# Patient Record
Sex: FEMALE | Race: WHITE | Employment: UNEMPLOYED | URBAN - METROPOLITAN AREA
[De-identification: names, ages, dates, MRNs, and addresses within clinical notes are randomized per-mention and may not be internally consistent; named-entity substitution may affect disease eponyms.]

---

## 2024-05-04 ENCOUNTER — HOSPITAL ENCOUNTER (EMERGENCY)
Facility: HOSPITAL | Age: 24
Discharge: HOME | End: 2024-05-04
Attending: EMERGENCY MEDICINE
Payer: MEDICAID

## 2024-05-04 VITALS
OXYGEN SATURATION: 99 % | HEIGHT: 65 IN | BODY MASS INDEX: 20.94 KG/M2 | TEMPERATURE: 98 F | RESPIRATION RATE: 20 BRPM | WEIGHT: 125.69 LBS | DIASTOLIC BLOOD PRESSURE: 80 MMHG | SYSTOLIC BLOOD PRESSURE: 136 MMHG | HEART RATE: 92 BPM

## 2024-05-04 DIAGNOSIS — N30.01 ACUTE CYSTITIS WITH HEMATURIA: Primary | ICD-10-CM

## 2024-05-04 LAB
BILIRUB UR QL STRIP.AUTO: NEGATIVE
CLARITY UR REFRACT.AUTO: CLEAR
COLOR UR AUTO: COLORLESS
GLUCOSE UR STRIP.AUTO-MCNC: NORMAL MG/DL
KETONES UR STRIP.AUTO-MCNC: NEGATIVE MG/DL
LEUKOCYTE ESTERASE UR QL STRIP.AUTO: 500
NITRITE UR QL STRIP.AUTO: NEGATIVE
PH UR STRIP.AUTO: 5.5 [PH] (ref 5–8)
PROT UR STRIP.AUTO-MCNC: NEGATIVE MG/DL
SP GR UR STRIP.AUTO: 1.01 (ref 1–1.03)
UROBILINOGEN UR STRIP.AUTO-MCNC: NORMAL MG/DL

## 2024-05-04 PROCEDURE — 87086 URINE CULTURE/COLONY COUNT: CPT | Performed by: EMERGENCY MEDICINE

## 2024-05-04 PROCEDURE — 99283 EMERGENCY DEPT VISIT LOW MDM: CPT

## 2024-05-04 PROCEDURE — 99284 EMERGENCY DEPT VISIT MOD MDM: CPT

## 2024-05-04 PROCEDURE — 81001 URINALYSIS AUTO W/SCOPE: CPT | Performed by: EMERGENCY MEDICINE

## 2024-05-04 RX ORDER — CEPHALEXIN 500 MG/1
500 CAPSULE ORAL 2 TIMES DAILY
Qty: 10 CAPSULE | Refills: 0 | Status: SHIPPED | OUTPATIENT
Start: 2024-05-04 | End: 2024-05-09

## 2024-05-04 NOTE — ED PROVIDER NOTES
Patient Seen in: [facility] Emergency Department      History     Chief Complaint   Patient presents with    Urinary Symptoms     Blood in urine, 1 month post partum vaginal delivery      Stated Complaint: urinary issues    Subjective:   23-year-old female, no chronic past medical history, reports vaginal delivery about 6 weeks ago with no complications, presents with hematuria.  Noticed it yesterday and today.  She no urinary frequency urgency or dysuria.  She has no vaginal bleeding.  States it is not coming as a vaginal source is completely in her urine.  No clots.  No flank pain or acute back pain.  No fevers.  No abdominal pain whatsoever.  She has no other complaints.            Objective:   History reviewed. No pertinent past medical history.           History reviewed. No pertinent surgical history.             Social History     Socioeconomic History    Marital status:    Tobacco Use    Smoking status: Never    Smokeless tobacco: Never   Vaping Use    Vaping status: Never Used   Substance and Sexual Activity    Alcohol use: Not Currently    Drug use: Never              Review of Systems   Constitutional:  Negative for fever.   Respiratory:  Negative for shortness of breath.    Cardiovascular:  Negative for chest pain.   Gastrointestinal:  Negative for abdominal pain.   Genitourinary:  Positive for hematuria. Negative for dysuria, flank pain, pelvic pain and vaginal bleeding.   Musculoskeletal:  Negative for back pain.   Neurological:  Negative for headaches.       Positive for stated complaint: urinary issues  Other systems are as noted in HPI.  Constitutional and vital signs reviewed.      All other systems reviewed and negative except as noted above.    Physical Exam     ED Triage Vitals [05/04/24 0800]   /90   Pulse 105   Resp 20   Temp 98.5 °F (36.9 °C)   Temp src Oral   SpO2 99 %   O2 Device None (Room air)       Current:/85   Pulse 115   Temp 98.5 °F (36.9 °C) (Oral)    Resp 20   Ht 165.1 cm (5' 5\")   Wt 57 kg   LMP  (LMP Unknown)   SpO2 98%   BMI 20.91 kg/m²         Physical Exam  Vitals and nursing note reviewed.   HENT:      Head: Normocephalic.   Cardiovascular:      Rate and Rhythm: Normal rate and regular rhythm.      Pulses: Normal pulses.      Heart sounds: Normal heart sounds.   Pulmonary:      Effort: Pulmonary effort is normal. No respiratory distress.      Breath sounds: Normal breath sounds.   Abdominal:      General: There is no distension.      Palpations: Abdomen is soft.      Tenderness: There is no abdominal tenderness. There is no right CVA tenderness, left CVA tenderness, guarding or rebound.   Musculoskeletal:         General: Normal range of motion.      Cervical back: Neck supple.   Skin:     General: Skin is warm and dry.   Neurological:      General: No focal deficit present.      Mental Status: She is alert.   Psychiatric:         Mood and Affect: Mood normal.         Behavior: Behavior normal.               ED Course     Labs Reviewed   URINALYSIS, ROUTINE - Abnormal; Notable for the following components:       Result Value    Urine Color Colorless (*)     Blood Urine Trace (*)     Leukocyte Esterase Urine 500 (*)     WBC Urine 6-10 (*)     RBC Urine 3-5 (*)     Bacteria Urine Rare (*)     Squamous Epi. Cells Few (*)     All other components within normal limits   URINE CULTURE, ROUTINE                      MDM      Differential diagnosis includes, but not limited to, kidney stone, vaginal bleeding, menstrual cycle, cystitis     at bedside helpful to provide information history present illness    External chart review demonstrates her delivery at [facility] last month    22-year-old female with some hematuria today and yesterday.  No real urgency frequency or dysuria.  No flank pain.  No abdominal pain.  Not vaginal bleeding.  She is only in her urine.  No clots.  No fevers.  Does have leukocyte esterase, bacteria like blood cells and blood.   Will treat her for UTI, Keflex.  She is breast-feeding.  Twice daily x 5 days.  Discussed risk benefits alternatives with her.  Shared decision making utilized and return precaution provided    Patient was screened and evaluated during this visit.  As the treating physician attending to the patient, I determined within reasonable clinical confidence and prior to discharge, that an emergency medical condition was not or was no longer present.  There was no indication for further evaluation, treatment, or admission on an emergency basis.  Comprehensive verbal and written discharge and follow-up instructions were provided to help prevent relapse or worsening.  Patient was instructed to follow-up with their primary care provider for further evaluation and treatment, return immediately to ER for worsening, concerning, new, or changing/persisting symptoms. I discussed the case with the patient and they had no questions, complaints, or concerns.  Patient was comfortable going home.     Per the discharge paperwork, patients are encouraged to and given instructions on how to sign up for MyChart, where they have access to their records, including any/all incidental findings.     This note was prepared using Dragon Medical voice recognition dictation software. As a result errors may occur. When identified these errors have been corrected. While every attempt is made to correct errors during dictation discrepancies may still exist    Note to patient: The 21st Century Cures Act makes medical notes like these available to patients in the interest of transparency. However, this is a medical document intended as peer to peer communication. It is written in medical language and may contain abbreviations or verbiage that are unfamiliar. It may appear blunt or direct. Medical documents are intended to carry relevant information, facts as evident, and the clinical opinion of the practitioner.                                       Medical Decision Making      Disposition and Plan     Clinical Impression:  1. Acute cystitis with hematuria         Disposition:  Discharge  5/4/2024  9:07 am    Follow-up:  Mony Gill, BRITTNEY  [address] [facility] [id number] [phone]    Follow up            Medications Prescribed:  Current Discharge Medication List        START taking these medications    Details   cephalexin 500 MG Oral Cap Take 1 capsule (500 mg total) by mouth 2 (two) times daily for 5 days.  Qty: 10 capsule, Refills: 0

## 2024-05-04 NOTE — ED INITIAL ASSESSMENT (HPI)
PT states that she noticed significant bloody urine since yesterday in the morning. PT states that she is concerned as she delivered her baby on 03/27/2024. PT states that she has no other symptoms at this time

## 2024-06-19 ENCOUNTER — HOSPITAL ENCOUNTER (EMERGENCY)
Facility: HOSPITAL | Age: 24
Discharge: HOME | End: 2024-06-19
Attending: EMERGENCY MEDICINE

## 2024-06-19 VITALS
OXYGEN SATURATION: 96 % | BODY MASS INDEX: 19.96 KG/M2 | TEMPERATURE: 98 F | RESPIRATION RATE: 18 BRPM | WEIGHT: 116.88 LBS | SYSTOLIC BLOOD PRESSURE: 130 MMHG | HEART RATE: 116 BPM | HEIGHT: 64.17 IN | DIASTOLIC BLOOD PRESSURE: 85 MMHG

## 2024-06-19 DIAGNOSIS — N61.0 MASTITIS: Primary | ICD-10-CM

## 2024-06-19 PROCEDURE — 99283 EMERGENCY DEPT VISIT LOW MDM: CPT

## 2024-06-19 PROCEDURE — 99284 EMERGENCY DEPT VISIT MOD MDM: CPT

## 2024-06-19 NOTE — DISCHARGE INSTRUCTIONS
You were seen in the emergency department.  Please take the antibiotics as prescribed.  With the instructions provided.  Apply warm compresses to the painful area throughout the day.  Continue to breast-feed.  Return to the emergency department if develop fevers, vomiting, worsening pain or swelling, or any other new concerns or worsening symptoms.  Please follow-up closely with your OB/GYN or primary care physician for reevaluation.

## 2024-06-19 NOTE — ED PROVIDER NOTES
Patient Seen in: [facility] Emergency Department      History     Chief Complaint   Patient presents with    Breast Problem     Stated Complaint: breast issue    Subjective:   HPI  Patient is a 25 yo F who is approximately 3 months post partum s/p vaginal delivery who presents to ED for evaluation of L sided breast pain and redness over past 2 days. Pt notes she had tmax of 38 at home on Monday which resolved after taking ibuprofen. Pt denies any drainage. No vomiting, abd pain. Pt is currently breastfeeding.     Objective:   History reviewed. No pertinent past medical history.           History reviewed. No pertinent surgical history.             Social History     Socioeconomic History    Marital status:    Tobacco Use    Smoking status: Never    Smokeless tobacco: Never   Vaping Use    Vaping status: Never Used   Substance and Sexual Activity    Alcohol use: Not Currently    Drug use: Never              Review of Systems    Positive for stated complaint: breast issue  Other systems are as noted in HPI.  Constitutional and vital signs reviewed.      All other systems reviewed and negative except as noted above.    Physical Exam     ED Triage Vitals [06/19/24 0847]   /85   Pulse 116   Resp 18   Temp 97.8 °F (36.6 °C)   Temp src Temporal   SpO2 96 %   O2 Device None (Room air)       Current Vitals:   Vital Signs  BP: 130/85  Pulse: 116  Resp: 18  Temp: 97.8 °F (36.6 °C)  Temp src: Temporal    Oxygen Therapy  SpO2: 96 %  O2 Device: None (Room air)            Physical Exam  Vitals and nursing note reviewed.   Constitutional:       General: She is not in acute distress.     Appearance: She is not ill-appearing.   HENT:      Head: Normocephalic and atraumatic.      Mouth/Throat:      Mouth: Mucous membranes are moist.   Eyes:      Extraocular Movements: Extraocular movements intact.      Pupils: Pupils are equal, round, and reactive to light.   Cardiovascular:      Rate and Rhythm: Normal rate and  regular rhythm.   Pulmonary:      Effort: Pulmonary effort is normal.   Chest:          Comments: No nipple drainage  Abdominal:      General: There is no distension.      Palpations: Abdomen is soft.      Tenderness: There is no abdominal tenderness.   Musculoskeletal:      Cervical back: Neck supple.      Right lower leg: No edema.      Left lower leg: No edema.   Skin:     General: Skin is warm and dry.      Capillary Refill: Capillary refill takes less than 2 seconds.   Neurological:      General: No focal deficit present.      Mental Status: She is alert.   Psychiatric:         Mood and Affect: Mood normal.               ED Course   Labs Reviewed - No data to display                   MDM      Patient is a 23 yo F who is approximately 3 months post partum s/p vaginal delivery who presents to ED for evaluation of L sided breast pain and redness over past 2 days. Tmax of 38 at home. VSS. Pt is well appearing on exam. She has mild L sided breast tenderness and erythema at 12 o'clock position. No drainage. No fluctuance. Initial differential considered includes but not limited to mastitis, abscess.  Bedside US performed which shows no obvious fluid collection. I counseled patient on supportive care with warm compresses. Discussed continuing breastfeeding. Prescribed dicloxacillin for mastitis. Recommended close f/u with OB/GYN or PCP for re-evaluation. Patient is stable for discharge home. Discussed strict return precautions and supportive care. Patient verbalized understanding and agreement of plan.                                      Medical Decision Making      Disposition and Plan     Clinical Impression:  1. Mastitis         Disposition:  Discharge  6/19/2024  9:31 am    Follow-up:  No follow-up provider specified.        Medications Prescribed:  Discharge Medication List as of 6/19/2024  9:35 AM        START taking these medications    Details   dicloxacillin 500 MG Oral Cap Take 1 capsule (500 mg total) by  mouth 4 (four) times daily for 10 days., Normal, Disp-40 capsule, R-0

## 2024-06-19 NOTE — ED INITIAL ASSESSMENT (HPI)
Since Mon pt c/o pain and redness in her left breast. Pt had a fever Mon took Ibuprofen and it went away. Pt is currently breastfeeding.

## 2024-07-23 ENCOUNTER — HOSPITAL ENCOUNTER (EMERGENCY)
Facility: HOSPITAL | Age: 24
Discharge: HOME | End: 2024-07-23
Attending: EMERGENCY MEDICINE
Payer: MEDICAID

## 2024-07-23 VITALS
HEART RATE: 97 BPM | TEMPERATURE: 97 F | RESPIRATION RATE: 18 BRPM | DIASTOLIC BLOOD PRESSURE: 74 MMHG | OXYGEN SATURATION: 99 % | SYSTOLIC BLOOD PRESSURE: 112 MMHG

## 2024-07-23 DIAGNOSIS — R31.9 URINARY TRACT INFECTION WITH HEMATURIA, SITE UNSPECIFIED: Primary | ICD-10-CM

## 2024-07-23 DIAGNOSIS — N39.0 URINARY TRACT INFECTION WITH HEMATURIA, SITE UNSPECIFIED: Primary | ICD-10-CM

## 2024-07-23 LAB
B-HCG UR QL: NEGATIVE
BILIRUB UR QL STRIP.AUTO: NEGATIVE
GLUCOSE UR STRIP.AUTO-MCNC: NORMAL MG/DL
KETONES UR STRIP.AUTO-MCNC: NEGATIVE MG/DL
LEUKOCYTE ESTERASE UR QL STRIP.AUTO: 500
NITRITE UR QL STRIP.AUTO: NEGATIVE
PH UR STRIP.AUTO: 5.5 [PH] (ref 5–8)
PROT UR STRIP.AUTO-MCNC: 30 MG/DL
RBC #/AREA URNS AUTO: >10 /HPF
SP GR UR STRIP.AUTO: 1.02 (ref 1–1.03)
UROBILINOGEN UR STRIP.AUTO-MCNC: NORMAL MG/DL

## 2024-07-23 PROCEDURE — 81001 URINALYSIS AUTO W/SCOPE: CPT | Performed by: EMERGENCY MEDICINE

## 2024-07-23 PROCEDURE — 99283 EMERGENCY DEPT VISIT LOW MDM: CPT

## 2024-07-23 PROCEDURE — 87086 URINE CULTURE/COLONY COUNT: CPT | Performed by: EMERGENCY MEDICINE

## 2024-07-23 PROCEDURE — 81025 URINE PREGNANCY TEST: CPT

## 2024-07-23 RX ORDER — CEFADROXIL 500 MG/1
500 CAPSULE ORAL 2 TIMES DAILY
Qty: 14 CAPSULE | Refills: 0 | Status: SHIPPED | OUTPATIENT
Start: 2024-07-23 | End: 2024-07-30

## 2024-07-23 NOTE — DISCHARGE INSTRUCTIONS
Duricef as prescribed  Drink plenty of fluids  Tylenol or ibuprofen as needed for any pain  Return if worse  Recheck with primary care physician on Friday

## 2024-07-23 NOTE — ED PROVIDER NOTES
Patient Seen in: [facility] Emergency Department      History     Chief Complaint   Patient presents with    Hematuria    Urinary Symptoms     Stated Complaint: pain with urination, blood in urine, started this morning    Subjective:   HPI    This is a 24-year-old female status postnormal spontaneous vaginal delivery 3/27/2024 with no significant past medical history who presents with dysuria/hematuria which began this morning.  She denies any fevers.  No back pain.  No nausea or vomiting.  She is currently nursing and has a 4-month-old child.  She has not had a menstrual cycle since she has had her child.  She states has been using condoms.  She was in the emergency room 6/19/2024 with mastitis and placed on dicloxacillin.  She also had an ED visit on 5/4/2024 for cystitis/hematuria.  The urine culture was negative.  She was placed on cephalexin at bedtime.  She presents here for further evaluation.                    Objective:   No pertinent past medical history.            No pertinent past surgical history.              No pertinent social history.            Review of Systems    Positive for stated Chief Complaint: Hematuria and Urinary Symptoms    Other systems are as noted in HPI.  Constitutional and vital signs reviewed.      All other systems reviewed and negative except as noted above.    Physical Exam     ED Triage Vitals [07/23/24 0915]   /74   Pulse 97   Resp 18   Temp 97 °F (36.1 °C)   Temp src Oral   SpO2 99 %   O2 Device None (Room air)       Current Vitals:   Vital Signs  BP: 112/74  Pulse: 97  Resp: 18  Temp: 97 °F (36.1 °C)  Temp src: Oral    Oxygen Therapy  SpO2: 99 %  O2 Device: None (Room air)            Physical Exam    GENERAL: Awake, alert oriented x3, nontoxic appearing.   SKIN: Normal, warm, and dry.  HEENT:  Pupils equally round and reactive to light. Conjuctiva clear.  Oropharynx is clear and moist.   Lungs: Clear to auscultation bilaterally with no rales, no retractions,  and no wheezing.  HEART:  Regular rate and rhythm. S1 and S2. No murmurs, no rubs or gallops.   ABDOMEN: Soft, nontender and nondistended. Normoactive bowel sounds. No rebound. No guarding.   Back: No CVA tenderness  EXTREMITIES: Warm with brisk capillary refill.         ED Course     Labs Reviewed   URINALYSIS WITH CULTURE REFLEX - Abnormal; Notable for the following components:       Result Value    Clarity Urine Turbid (*)     Blood Urine 3+ (*)     Protein Urine 30 (*)     Leukocyte Esterase Urine 500 (*)     WBC Urine 21-50 (*)     RBC Urine >10 (*)     Squamous Epi. Cells Few (*)     All other components within normal limits   POCT PREGNANCY URINE - Normal   URINE CULTURE, ROUTINE       MDM       This is a 24-year-old female no significant past medical history who presents with dysuria/hematuria which began this morning.  Differential includes UTI, cystitis.    Urine pregnancy:  Urinalysis: Nitrite negative.  Leuk esterase 500.  WBC 21-50.  RBC greater than 10.      Patient will be placed on Duricef pending urine culture.    Plan was discussed with patient.  She expresses understanding.  Should follow-up with PCP on Friday.  Patient discharged home in good condition.          Disposition and Plan     Clinical Impression:  1. Urinary tract infection with hematuria, site unspecified         Disposition:  Discharge  7/23/2024 10:31 am    Follow-up:  Mony Gill APRN  [address] [facility] [id number] [phone]    Follow up on 7/26/2024            Medications Prescribed:  Current Discharge Medication List        START taking these medications    Details   cefadroxil 500 MG Oral Cap Take 1 capsule (500 mg total) by mouth 2 (two) times daily for 7 days.  Qty: 14 capsule, Refills: 0

## 2025-04-08 ENCOUNTER — HOSPITAL ENCOUNTER (EMERGENCY)
Facility: HOSPITAL | Age: 25
Discharge: HOME | End: 2025-04-08
Attending: EMERGENCY MEDICINE
Payer: MEDICAID

## 2025-04-08 VITALS
DIASTOLIC BLOOD PRESSURE: 77 MMHG | SYSTOLIC BLOOD PRESSURE: 102 MMHG | BODY MASS INDEX: 19.96 KG/M2 | RESPIRATION RATE: 18 BRPM | OXYGEN SATURATION: 100 % | TEMPERATURE: 99 F | HEIGHT: 64.17 IN | WEIGHT: 116.88 LBS | HEART RATE: 85 BPM

## 2025-04-08 DIAGNOSIS — N30.01 ACUTE CYSTITIS WITH HEMATURIA: Primary | ICD-10-CM

## 2025-04-08 LAB
B-HCG UR QL: NEGATIVE
BILIRUB UR QL STRIP.AUTO: NEGATIVE
GLUCOSE UR STRIP.AUTO-MCNC: NORMAL MG/DL
HYALINE CASTS #/AREA URNS AUTO: PRESENT /LPF
KETONES UR STRIP.AUTO-MCNC: NEGATIVE MG/DL
LEUKOCYTE ESTERASE UR QL STRIP.AUTO: 250
NITRITE UR QL STRIP.AUTO: NEGATIVE
PH UR STRIP.AUTO: 6.5 [PH] (ref 5–8)
PROT UR STRIP.AUTO-MCNC: 20 MG/DL
RBC #/AREA URNS AUTO: >10 /HPF
SP GR UR STRIP.AUTO: 1.02 (ref 1–1.03)
UROBILINOGEN UR STRIP.AUTO-MCNC: NORMAL MG/DL
WBC #/AREA URNS AUTO: >50 /HPF

## 2025-04-08 PROCEDURE — 99283 EMERGENCY DEPT VISIT LOW MDM: CPT

## 2025-04-08 PROCEDURE — 87086 URINE CULTURE/COLONY COUNT: CPT | Performed by: EMERGENCY MEDICINE

## 2025-04-08 PROCEDURE — 81025 URINE PREGNANCY TEST: CPT

## 2025-04-08 PROCEDURE — 81001 URINALYSIS AUTO W/SCOPE: CPT

## 2025-04-08 PROCEDURE — 81001 URINALYSIS AUTO W/SCOPE: CPT | Performed by: EMERGENCY MEDICINE

## 2025-04-08 RX ORDER — CEPHALEXIN 500 MG/1
500 CAPSULE ORAL 3 TIMES DAILY
Qty: 15 CAPSULE | Refills: 0 | Status: SHIPPED | OUTPATIENT
Start: 2025-04-08 | End: 2025-04-13

## 2025-04-08 NOTE — ED PROVIDER NOTES
Patient Seen in: [facility] Emergency Department      History     Chief Complaint   Patient presents with    Urinary Symptoms     Stated Complaint: Blood in urine    Subjective:   24-year-old female, no cardiovascular disease, takes the medication, presents with urinary frequency urgency dysuria hematuria started today.  States she gets about 3 UTIs a year.  States her last one about 5 months ago.  Usually resolved with antibiotic she is never seen urology.  No history of kidney stones.  LMP was a few weeks ago.  She is not currently on her menstrual cycle.  She has no abdominal pain.  No fever.  No vomiting.  No other complaints.              Objective:     History reviewed. No pertinent past medical history.           History reviewed. No pertinent surgical history.             Social History     Socioeconomic History    Marital status:    Tobacco Use    Smoking status: Never    Smokeless tobacco: Never   Vaping Use    Vaping status: Never Used   Substance and Sexual Activity    Alcohol use: Not Currently    Drug use: Never                  Physical Exam     ED Triage Vitals [04/08/25 1142]   /85   Pulse 96   Resp 16   Temp 98.6 °F (37 °C)   Temp src    SpO2 99 %   O2 Device None (Room air)       Current Vitals:   Vital Signs  BP: 102/77  Pulse: 85  Resp: 18  Temp: 98.6 °F (37 °C)    Oxygen Therapy  SpO2: 100 %  O2 Device: None (Room air)        Physical Exam  Vitals and nursing note reviewed.   Constitutional:       General: She is not in acute distress.     Appearance: She is not toxic-appearing.   HENT:      Head: Normocephalic.   Cardiovascular:      Rate and Rhythm: Normal rate.      Heart sounds: Normal heart sounds.   Pulmonary:      Effort: Pulmonary effort is normal. No respiratory distress.      Breath sounds: Normal breath sounds.   Abdominal:      General: There is no distension.      Palpations: Abdomen is soft.      Tenderness: There is no abdominal tenderness. There is no right  CVA tenderness, left CVA tenderness or guarding.   Musculoskeletal:         General: Normal range of motion.      Cervical back: Neck supple.   Skin:     General: Skin is warm and dry.   Neurological:      General: No focal deficit present.      Mental Status: She is alert.   Psychiatric:         Behavior: Behavior normal.             ED Course     Labs Reviewed   URINALYSIS WITH CULTURE REFLEX - Abnormal; Notable for the following components:       Result Value    Clarity Urine Turbid (*)     Blood Urine 3+ (*)     Protein Urine 20 (*)     Leukocyte Esterase Urine 250 (*)     WBC Urine >50 (*)     RBC Urine >10 (*)     Squamous Epi. Cells Few (*)     Hyaline Casts Present (*)     All other components within normal limits   POCT PREGNANCY URINE - Normal   URINE CULTURE, ROUTINE                   MDM      External chart review demonstrates her outpatient visit with OB/GYN and lactation notes    Differential diagnosis includes, but not limited to, UTI, kidney stone, PID    24-year-old female with acute cystitis with hematuria.  Does have leuks and blood.  She has no flank pain.  No abdominal pain.  No fever.  No history of kidney stones.  Further workup including blood work, imaging offered discussed and declined.  She like antibiotics and like to go home.  She has one of her little children with her here today.  She is very well-appearing and nontoxic stable vital signs.  Nonsurgical abdomen.  Given urology follow-up.  Never seen urology.  Placed on Keflex.  Discussed breast-feeding.  Spoke to the pharmacist.  Discharge home for outpatient follow-up, return precaution provided.    Patient was screened and evaluated during this visit.  As the treating physician attending to the patient, I determined within reasonable clinical confidence and prior to discharge, that an emergency medical condition was not or was no longer present.  There was no indication for further evaluation, treatment, or admission on an emergency  basis.  Comprehensive verbal and written discharge and follow-up instructions were provided to help prevent relapse or worsening.  Patient was instructed to follow-up with their primary care provider for further evaluation and treatment, return immediately to ER for worsening, concerning, new, or changing/persisting symptoms. I discussed the case with the patient and they had no questions, complaints, or concerns.  Patient was comfortable going home.     Per the discharge paperwork, patients are encouraged to and given instructions on how to sign up for MyChart, where they have access to their records, including any/all incidental findings.     This note was prepared using Dragon Medical voice recognition dictation software. As a result errors may occur. When identified these errors have been corrected. While every attempt is made to correct errors during dictation discrepancies may still exist    Note to patient: The 21st Century Cures Act makes medical notes like these available to patients in the interest of transparency. However, this is a medical document intended as peer to peer communication. It is written in medical language and may contain abbreviations or verbiage that are unfamiliar. It may appear blunt or direct. Medical documents are intended to carry relevant information, facts as evident, and the clinical opinion of the practitioner.           Medical Decision Making      Disposition and Plan     Clinical Impression:  1. Acute cystitis with hematuria         Disposition:  Discharge  4/8/2025  1:57 pm    Follow-up:  Mony Gill APRN  [address] [facility] [address] [phone]    Follow up      Emiliano Durán MD  [address] [facility] [id number] [phone]    Follow up            Medications Prescribed:  Discharge Medication List as of 4/8/2025  2:13 PM        START taking these medications    Details   cephALEXin 500 MG Oral Cap Take 1 capsule (500 mg total) by mouth 3 (three) times daily  for 5 days., Normal, Disp-15 capsule, R-0                 Supplementary Documentation:

## 2025-04-28 ENCOUNTER — OFFICE VISIT (OUTPATIENT)
Dept: SURGERY | Facility: CLINIC | Age: 25
End: 2025-04-28

## 2025-04-28 DIAGNOSIS — R82.90 URINE FINDING: ICD-10-CM

## 2025-04-28 DIAGNOSIS — R31.0 GROSS HEMATURIA: Primary | ICD-10-CM

## 2025-04-28 LAB
APPEARANCE: CLEAR
BILIRUBIN: NEGATIVE
GLUCOSE (URINE DIPSTICK): NEGATIVE MG/DL
KETONES (URINE DIPSTICK): NEGATIVE MG/DL
LEUKOCYTES: NEGATIVE
MULTISTIX LOT#: NORMAL NUMERIC
NITRITE, URINE: NEGATIVE
OCCULT BLOOD: NEGATIVE
PH, URINE: 6.5 (ref 4.5–8)
PROTEIN (URINE DIPSTICK): NEGATIVE MG/DL
SPECIFIC GRAVITY: 1.02 (ref 1–1.03)
URINE-COLOR: YELLOW
UROBILINOGEN,SEMI-QN: 0.2 MG/DL (ref 0–1.9)

## 2025-04-28 NOTE — PROGRESS NOTES
[facility]    Urology Consult Note    History of Present Illness:   Patient is a 24 year old female with no significant medical history who presents today for consultation from Dr. Gill's office for gross hematuria.    Patient had  in 2024. Presented to the [facility] 2024 for gross hematuria without any other associated LUTS and denied that was of vaginal source. UA abnormal, culture <10K mixed britt. Was treated with Keflex for suspected UTI. She returned to the ED in 2024 for dysuria and gross hematuria without other associated symptoms. UA again abnormal, discharged on cefadroxil for possible UTI, final culture negative. Most recently presented to the ED 25 for urinary frequency, urgency, dysuria, and gross hematuria. Again UA abnormal, discharged on Keflex, final culture negative.     UA today negative.     No current voiding complaints. Denies abdominal or flank pain. No hx of stones.     Non smoker  No chemical exposures  No FH of  cancers.    Interpretor [id number]    HISTORY:  Past Medical History[1]   Past Surgical History[2]   Family History[3]   Social History: Short Social Hx on File[4]     Allergies  Allergies[5]    Review of Systems:   A 10-point review of systems was completed and is negative other than as noted above.    Physical Exam:   LMP 2025 (Exact Date)     GENERAL APPEARANCE: well developed, well nourished, in no acute distress  NEUROLOGIC: no localizing neurologic signs, alert and oriented x 3, converses appropriately  HEAD: atraumatic, normocephalic  EYES: sclera non-icteric  ORAL CAVITY: mucosa moist  NECK/THYROID: no obvious masses or goiter  LUNGS: non-labored breathing  ABDOMEN: soft, nontender, non distended  No CVAT  EXTREMITIES: warm, well-perfused. No clubbing, cyanosis or edema.  SKIN: no obvious rashes    Results:     Laboratory Data:  No results found for: \"WBC\", \"HGB\", \"PLT\"  No results found for: \"NA\", \"K\", \"CL\",  \"CO2\", \"BUN\", \"CREATININE\", \"GLU\", \"GFRAA\", \"AST\", \"ALT\", \"TP\", \"ALB\", \"PHOS\", \"CA\", \"MG\"    Urinalysis Results (last three years):  Recent Labs     05/04/24  0813 07/23/24  0921 04/08/25  1152 04/28/25  1634   COLORUR Colorless*  --  Light-Yellow  --    CLARITY Clear Turbid* Turbid*  --    SPECGRAVITY 1.011 1.017 1.019 1.025   PHURINE 5.5 5.5 6.5 6.5   PROUR Negative 30* 20*  --    GLUUR Normal Normal Normal  --    KETUR Negative Negative Negative  --    BILUR Negative Negative Negative  --    BLOODURINE Trace* 3+* 3+*  --    NITRITE Negative Negative Negative Negative   UROBILINOGEN Normal Normal Normal  --    LEUUR 500* 500* 250*  --    WBCUR 6-10* 21-50* >50*  --    RBCUR 3-5* >10* >10*  --    BACUR Rare* None Seen None Seen  --        Urine Culture Results (last three years):  Lab Results   Component Value Date    URINECUL  04/08/2025     <10,000 cfu/ml Multiple species present- probable contamination.    URINECUL No Growth at 18-24 hrs. 07/23/2024    URINECUL  05/04/2024     <10,000 cfu/ml Mixture of Gram positive organisms isolated - probable contamination.       Imaging  No results found.      Impression:     Patient is a 24 year old female with no significant medical history who presents today for consultation from Dr. Gill's office for gross hematuria.    Reviewed with patient potential etiologies of gross hematuria including but not limited to malignancy. Recommend further evaluation with CTU and cystoscopy.     Recommendations:  Send urine cytology. CTU and return for cystoscopy.     Thank you very much for this consult. Please call if there are any questions or concerns.     Karey Reyez PA-C  Urology  [facility]    Date: 4/28/2025           [1] No past medical history on file.  [2] No past surgical history on file.  [3] No family history on file.  [4]   Social History  Socioeconomic History    Marital status:    Tobacco Use    Smoking status: Never    Smokeless tobacco: Never    Vaping Use    Vaping status: Never Used   Substance and Sexual Activity    Alcohol use: Not Currently    Drug use: Never   [5] No Known Allergies

## 2025-04-29 LAB — NON GYNE INTERPRETATION: NEGATIVE

## 2025-05-13 ENCOUNTER — HOSPITAL ENCOUNTER (OUTPATIENT)
Dept: CT IMAGING | Facility: HOSPITAL | Age: 25
Discharge: HOME | End: 2025-05-13
Attending: PHYSICIAN ASSISTANT
Payer: MEDICAID

## 2025-05-13 DIAGNOSIS — R31.0 GROSS HEMATURIA: ICD-10-CM

## 2025-05-13 LAB
CREAT BLD-MCNC: 0.8 MG/DL (ref 0.55–1.02)
EGFRCR SERPLBLD CKD-EPI 2021: 105 ML/MIN/1.73M2 (ref 60–?)

## 2025-05-13 PROCEDURE — 74178 CT ABD&PLV WO CNTR FLWD CNTR: CPT | Performed by: PHYSICIAN ASSISTANT

## 2025-05-13 PROCEDURE — 82565 ASSAY OF CREATININE: CPT

## 2025-06-25 ENCOUNTER — PROCEDURE (OUTPATIENT)
Dept: SURGERY | Facility: CLINIC | Age: 25
End: 2025-06-25

## 2025-06-25 DIAGNOSIS — R31.0 GROSS HEMATURIA: Primary | ICD-10-CM

## 2025-06-25 PROCEDURE — 52000 CYSTOURETHROSCOPY: CPT | Performed by: UROLOGY

## 2025-06-25 RX ORDER — SULFAMETHOXAZOLE AND TRIMETHOPRIM 800; 160 MG/1; MG/1
1 TABLET ORAL ONCE
Status: COMPLETED | OUTPATIENT
Start: 2025-06-25 | End: 2025-06-25

## 2025-06-25 RX ADMIN — SULFAMETHOXAZOLE AND TRIMETHOPRIM 1 TABLET: 800; 160 TABLET ORAL at 12:20:00

## 2025-06-25 NOTE — PROGRESS NOTES
Clinic Procedure Note    INDICATIONS:    Patient is a 25 year old female with no significant medical history who presents today for consultation from Dr. Gill's office for gross hematuria.  Interpretor used previously- patient did not want today.     Patient had  in 2024. Presented to the [facility] 2024 for gross hematuria without any other associated LUTS and denied that was of vaginal source. UA abnormal, culture <10K mixed britt. Was treated with Keflex for suspected UTI. She returned to the ED in 2024 for dysuria and gross hematuria without other associated symptoms. UA again abnormal, discharged on cefadroxil for possible UTI, final culture negative. Most recently presented to the ED 25 for urinary frequency, urgency, dysuria, and gross hematuria. Again UA abnormal, discharged on Keflex, final culture negative.       No current voiding complaints. Denies abdominal or flank pain. No hx of stones.   Non smoker  No chemical exposures  No FH of  cancers.  Seen by JOHN last month;  reviewed with patient potential etiologies of gross hematuria including but not limited to malignancy. Recommended further evaluation with CTU and cystoscopy.      CTU ;     CONCLUSION:  No kidney stone, hydronephrosis, kidney enlargement, perinephric stranding, or sign of renal mass. Limited assessment of the urinary bladder which contains only a small amount of urine at the time of scanning, but based on the appearance on  this examination, no specific bladder abnormalities identified.  No definite bladder abnormality seen.  Retroverted uterus. There are prominent pelvic veins in the adnexa bilaterally, which can be seen in patients with pelvic congestion syndrome.    However this is nonspecific, and these imaging findings need to be correlated with any potential related signs or symptoms.    Cytology- - negative  She was told to discuss above with gynecologist. She has no pelvic pain. She has discussed with  gyn- no further workup indicated for now.  Above symptoms are now resolved. Doing well today.           PROCEDURE:       1. Flexible cystourethroscopy    DATE OF PROCEDURE: 2025     PRE-PROCEDURE DIAGNOSIS: Gross hematuria     POST-PROCEDURE DIAGNOSIS: Same     SURGEON: Romulo Dela Cruz MD    FINDINGS:    Urethra: Normal caliber urethra throughout without lesions    Bladder: Normal mucosa with no papillary lesions or erythema, no trabeculation    Ureteral orifices: Orthotopic    Other findings: None    PROCEDURE:   Patient was brought to the procedure suite and a time-out was performed identifiying the patient,  and procedure to be performed. The risks and benefits of the procedure were once again discussed with the patient including bleeding, infection, and dysuria. The patient agreed to proceed. The patient did not have any signs or symptoms of active UTI.    She was placed in supine position on the table with legs to the sides and the genital area was prepped and draped in the standard sterile fashion. A flexible cystoscope was inserted per urethra. There were no urethral strictures present. The bladder was fully inspected (including retroflexion) and showed findings as above. Both ureteral orifices were orthotopic. The scope was then carefully removed and once again no urethral abnormalities were noted.    There were no complications and the patient tolerated the procedure well.    IMPRESSION AND PLAN:     GH   Negative cysto, cytology. CT discussed- following with gyn. Appears asymptomatic from this. RTC 6mo.            Romulo Dela Cruz MD  Staff Urologist  [facility]  Office: [phone]